# Patient Record
Sex: MALE | Race: WHITE | HISPANIC OR LATINO | Employment: UNEMPLOYED | URBAN - METROPOLITAN AREA
[De-identification: names, ages, dates, MRNs, and addresses within clinical notes are randomized per-mention and may not be internally consistent; named-entity substitution may affect disease eponyms.]

---
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
## 2024-06-12 ENCOUNTER — HOSPITAL ENCOUNTER (OUTPATIENT)
Age: 19
Discharge: HOME | End: 2024-06-12
Payer: COMMERCIAL

## 2024-06-12 VITALS
TEMPERATURE: 99 F | SYSTOLIC BLOOD PRESSURE: 128 MMHG | HEIGHT: 67 IN | RESPIRATION RATE: 19 BRPM | BODY MASS INDEX: 23.54 KG/M2 | HEART RATE: 79 BPM | WEIGHT: 150 LBS | DIASTOLIC BLOOD PRESSURE: 59 MMHG | OXYGEN SATURATION: 99 %

## 2024-06-12 DIAGNOSIS — L29.0 PRURITUS ANI: Primary | ICD-10-CM

## 2024-06-12 DIAGNOSIS — L23.9 ALLERGIC DERMATITIS: ICD-10-CM

## 2024-06-12 LAB — T PALLIDUM AB SER QL IA: NONREACTIVE

## 2024-06-12 PROCEDURE — 99203 OFFICE O/P NEW LOW 30 MIN: CPT

## 2024-06-12 PROCEDURE — 36415 COLL VENOUS BLD VENIPUNCTURE: CPT

## 2024-06-12 PROCEDURE — 86780 TREPONEMA PALLIDUM: CPT | Performed by: NURSE PRACTITIONER

## 2024-06-12 RX ORDER — PREDNISONE 20 MG/1
TABLET ORAL
Qty: 21 TABLET | Refills: 0 | Status: SHIPPED | OUTPATIENT
Start: 2024-06-12 | End: 2024-06-23

## 2024-06-12 RX ORDER — HYDROCORTISONE ACETATE PRAMOXINE HCL 1; 1 G/100G; G/100G
1 CREAM TOPICAL 2 TIMES DAILY
Qty: 30 G | Refills: 0 | Status: SHIPPED | OUTPATIENT
Start: 2024-06-12

## 2024-06-12 NOTE — DISCHARGE INSTRUCTIONS
Keep area clean and dry.  We will notify you of test results next 24 to 48 hours.  Follow-up with your primary care

## 2024-06-12 NOTE — ED INITIAL ASSESSMENT (HPI)
States he has a rash to arms, legs back and chest, not itchy and present for about 1 week. Also states his anus is itchy and the skin on his penis os flaking

## 2024-06-13 NOTE — ED PROVIDER NOTES
Patient Seen in: [facility]      History     Chief Complaint   Patient presents with    Rash     Stated Complaint: body rash, eval g    Subjective:   19-year-old male presents to immediate care for rash.  Patient states that he had a similar rash in February he was tested for STD and syphilis at that time which revealed negative results.  He states the rash has been coming and going does use some hydrocortisone cream over-the-counter however he states the rash returned shortly after stopping using it.  He denies any itching denies any known contacts.  He also reports itching and discomfort to his anus            Objective:   History reviewed. No pertinent past medical history.           History reviewed. No pertinent surgical history.             Social History     Socioeconomic History    Marital status: Single   Tobacco Use    Smoking status: Never    Smokeless tobacco: Never   Vaping Use    Vaping status: Never Used   Substance and Sexual Activity    Alcohol use: Not Currently    Drug use: Never              Review of Systems   Constitutional: Negative.    Respiratory: Negative.     Cardiovascular: Negative.    Gastrointestinal: Negative.    Skin: Negative.    Neurological: Negative.        Positive for stated complaint: body rash, eval g  Other systems are as noted in HPI.  Constitutional and vital signs reviewed.      All other systems reviewed and negative except as noted above.    Physical Exam     ED Triage Vitals [06/12/24 1826]   /59   Pulse 79   Resp 19   Temp 98.9 °F (37.2 °C)   Temp src Temporal   SpO2 99 %   O2 Device None (Room air)       Current Vitals:   Vital Signs  BP: 128/59  Pulse: 79  Resp: 19  Temp: 98.9 °F (37.2 °C)  Temp src: Temporal    Oxygen Therapy  SpO2: 99 %  O2 Device: None (Room air)            Physical Exam  Vitals and nursing note reviewed.   Constitutional:       General: He is not in acute distress.  HENT:      Head: Normocephalic.   Cardiovascular:       Rate and Rhythm: Normal rate.   Pulmonary:      Effort: Pulmonary effort is normal.   Musculoskeletal:         General: Normal range of motion.   Skin:     General: Skin is warm and dry.   Neurological:      General: No focal deficit present.      Mental Status: He is alert and oriented to person, place, and time.               ED Course     Labs Reviewed   T PALLIDUM SCREENING CASCADE                      MDM      Medical Decision Making  Pertinent Labs & Imaging studies reviewed. (See chart for details).  Patient coming in with rash, anal itching.   Differential diagnosis includes allergic urticaria, allergic dermatitis, pruritus ani, syphilis  Labs reviewed syphilis test pending.  Will treat for allergic dermatitis, pruritus ani.  Will discharge on prednisone, Proctofoam. Patient is comfortable with this plan.     Overall Pt looks good. Non-toxic, well-hydrated and in no respiratory distress. Vital signs are reassuring. Exam is reassuring. I do not believe pt requires and additional diagnostic studies or intervention. I believe pt can be discharged home to continue evaluation as an outpatient. Follow-up provider given. Discharge instructions given and reviewed. Return for any problems. All understand and agreewith the plan.     Please note that this report has been produced using speech recognition software and may contain errors related to that system including, but not limited to, errors in grammar, punctuation, and spelling, as well as words and phrases that possibly may have been recognized inappropriately. If there are any questions or concerns, contact the dictating provider for clarification.       The 21st Century Cures Act makes Medical Notes like these available to patients in the interest of transparency.  However, be advised this is a medical document.  It is intended as peer to peer communication.  It is written in medical language and may contain abbreviations or verbiage that are unfamiliar.  It  may appear blunt or direct.  Medical documents are intended to carry relevant information, facts as evident, and the clinical opinion of the practitioner      Problems Addressed:  Allergic dermatitis: acute illness or injury  Pruritus ani: acute illness or injury        Disposition and Plan     Clinical Impression:  1. Pruritus ani    2. Allergic dermatitis         Disposition:  Discharge  6/12/2024  7:02 pm    Follow-up:  Patrice Jarrett MD  [address] [facility] [address]  [facility] [address]  [phone]                Medications Prescribed:  Discharge Medication List as of 6/12/2024  7:07 PM        START taking these medications    Details   predniSONE 20 MG Oral Tab Take 3 tablets (60 mg total) by mouth daily for 2 days, THEN 2.5 tablets (50 mg total) daily for 2 days, THEN 2 tablets (40 mg total) daily for 2 days, THEN 1.5 tablets (30 mg total) daily for 2 days, THEN 1 tablet (20 mg total) daily for 2 days, THEN 0. 5 tablets (10 mg total) daily for 2 days., Normal, Disp-21 tablet, R-0      Hydrocortisone Ace-Pramoxine 1-1 % External Cream Apply 1 Application topically in the morning and 1 Application before bedtime., Normal, Disp-30 g, R-0

## 2024-06-14 ENCOUNTER — OFFICE VISIT (OUTPATIENT)
Dept: INTERNAL MEDICINE CLINIC | Facility: CLINIC | Age: 19
End: 2024-06-14
Payer: COMMERCIAL

## 2024-06-14 ENCOUNTER — LAB ENCOUNTER (OUTPATIENT)
Dept: LAB | Age: 19
End: 2024-06-14
Attending: INTERNAL MEDICINE
Payer: COMMERCIAL

## 2024-06-14 VITALS
RESPIRATION RATE: 16 BRPM | WEIGHT: 159.63 LBS | SYSTOLIC BLOOD PRESSURE: 120 MMHG | DIASTOLIC BLOOD PRESSURE: 56 MMHG | BODY MASS INDEX: 25.35 KG/M2 | OXYGEN SATURATION: 99 % | TEMPERATURE: 99 F | HEIGHT: 66.5 IN | HEART RATE: 80 BPM

## 2024-06-14 DIAGNOSIS — Z00.00 PREVENTATIVE HEALTH CARE: ICD-10-CM

## 2024-06-14 DIAGNOSIS — N48.89 PENILE IRRITATION: ICD-10-CM

## 2024-06-14 DIAGNOSIS — R21 RASH AND NONSPECIFIC SKIN ERUPTION: ICD-10-CM

## 2024-06-14 DIAGNOSIS — R21 RASH AND NONSPECIFIC SKIN ERUPTION: Primary | ICD-10-CM

## 2024-06-14 LAB
ALBUMIN SERPL-MCNC: 5.1 G/DL (ref 3.2–4.8)
ALBUMIN/GLOB SERPL: 1.5 {RATIO} (ref 1–2)
ALP LIVER SERPL-CCNC: 78 U/L
ALT SERPL-CCNC: 25 U/L
ANION GAP SERPL CALC-SCNC: 7 MMOL/L (ref 0–18)
AST SERPL-CCNC: 25 U/L (ref ?–34)
BASOPHILS # BLD AUTO: 0.02 X10(3) UL (ref 0–0.2)
BASOPHILS NFR BLD AUTO: 0.2 %
BILIRUB SERPL-MCNC: 1 MG/DL (ref 0.3–1.2)
BUN BLD-MCNC: 16 MG/DL (ref 9–23)
BUN/CREAT SERPL: 15.4 (ref 10–20)
CALCIUM BLD-MCNC: 10.3 MG/DL (ref 8.7–10.4)
CHLORIDE SERPL-SCNC: 104 MMOL/L (ref 98–112)
CO2 SERPL-SCNC: 28 MMOL/L (ref 21–32)
CREAT BLD-MCNC: 1.04 MG/DL
DEPRECATED RDW RBC AUTO: 38.1 FL (ref 35.1–46.3)
EGFRCR SERPLBLD CKD-EPI 2021: 105 ML/MIN/1.73M2 (ref 60–?)
EOSINOPHIL # BLD AUTO: 0 X10(3) UL (ref 0–0.7)
EOSINOPHIL NFR BLD AUTO: 0 %
ERYTHROCYTE [DISTWIDTH] IN BLOOD BY AUTOMATED COUNT: 12 % (ref 11–15)
FASTING STATUS PATIENT QL REPORTED: NO
GLOBULIN PLAS-MCNC: 3.3 G/DL (ref 2–3.5)
GLUCOSE BLD-MCNC: 122 MG/DL (ref 70–99)
HCT VFR BLD AUTO: 45.4 %
HGB BLD-MCNC: 15.6 G/DL
IMM GRANULOCYTES # BLD AUTO: 0.05 X10(3) UL (ref 0–1)
IMM GRANULOCYTES NFR BLD: 0.4 %
LYMPHOCYTES # BLD AUTO: 0.92 X10(3) UL (ref 1–4)
LYMPHOCYTES NFR BLD AUTO: 7.5 %
MCH RBC QN AUTO: 29.8 PG (ref 26–34)
MCHC RBC AUTO-ENTMCNC: 34.4 G/DL (ref 31–37)
MCV RBC AUTO: 86.8 FL
MONOCYTES # BLD AUTO: 0.15 X10(3) UL (ref 0.1–1)
MONOCYTES NFR BLD AUTO: 1.2 %
NEUTROPHILS # BLD AUTO: 11.2 X10 (3) UL (ref 1.5–7.7)
NEUTROPHILS # BLD AUTO: 11.2 X10(3) UL (ref 1.5–7.7)
NEUTROPHILS NFR BLD AUTO: 90.7 %
OSMOLALITY SERPL CALC.SUM OF ELEC: 290 MOSM/KG (ref 275–295)
PLATELET # BLD AUTO: 249 10(3)UL (ref 150–450)
POTASSIUM SERPL-SCNC: 4.4 MMOL/L (ref 3.5–5.1)
PROT SERPL-MCNC: 8.4 G/DL (ref 5.7–8.2)
RBC # BLD AUTO: 5.23 X10(6)UL
SODIUM SERPL-SCNC: 139 MMOL/L (ref 136–145)
WBC # BLD AUTO: 12.3 X10(3) UL (ref 4–11)

## 2024-06-14 PROCEDURE — 86225 DNA ANTIBODY NATIVE: CPT | Performed by: INTERNAL MEDICINE

## 2024-06-14 PROCEDURE — 82785 ASSAY OF IGE: CPT | Performed by: INTERNAL MEDICINE

## 2024-06-14 PROCEDURE — 3008F BODY MASS INDEX DOCD: CPT | Performed by: INTERNAL MEDICINE

## 2024-06-14 PROCEDURE — 80053 COMPREHEN METABOLIC PANEL: CPT | Performed by: INTERNAL MEDICINE

## 2024-06-14 PROCEDURE — 99203 OFFICE O/P NEW LOW 30 MIN: CPT | Performed by: INTERNAL MEDICINE

## 2024-06-14 PROCEDURE — 3078F DIAST BP <80 MM HG: CPT | Performed by: INTERNAL MEDICINE

## 2024-06-14 PROCEDURE — 3074F SYST BP LT 130 MM HG: CPT | Performed by: INTERNAL MEDICINE

## 2024-06-14 PROCEDURE — 86038 ANTINUCLEAR ANTIBODIES: CPT | Performed by: INTERNAL MEDICINE

## 2024-06-14 PROCEDURE — 85025 COMPLETE CBC W/AUTO DIFF WBC: CPT | Performed by: INTERNAL MEDICINE

## 2024-06-14 PROCEDURE — 86003 ALLG SPEC IGE CRUDE XTRC EA: CPT | Performed by: INTERNAL MEDICINE

## 2024-06-14 RX ORDER — CLOTRIMAZOLE AND BETAMETHASONE DIPROPIONATE 10; .64 MG/G; MG/G
1 CREAM TOPICAL 2 TIMES DAILY PRN
Qty: 45 G | Refills: 0 | Status: SHIPPED | OUTPATIENT
Start: 2024-06-14

## 2024-06-14 NOTE — PATIENT INSTRUCTIONS
- Continue prednisone tablets until prescription is finished  - Get further blood tests done today  - If your arm/leg rash does not improve or comes back again, follow up with Dermatology specialists:  Dr. Royal  [address]  [address]  [place], [place] [address]  [phone]    Dr. Camarena  [address] [facility]  [address]  [place], [place] [id number] [phone]    - For penile irritation/bumps, start prescription betamethasone-clotrimazole cream.  Apply twice daily for 2-3 weeks.    It was a pleasure seeing you in the clinic today.  Thank you for choosing the [facility] [place] office for your healthcare needs. Please call at [phone] with any questions or concerns.    Bessie Ackerman MD

## 2024-06-14 NOTE — ED NOTES
Attempted to contact the patient but was unsuccessful. Left a message for him to call back RE: Negative lab test.

## 2024-06-14 NOTE — PROGRESS NOTES
Aníbal Zamora is a 19 year old male.   HPI:   Patient presents to discuss several issues.  Has been dealing with a body wide rash for past few weeks - initially had one or two spots on legs about two weeks ago - then spread to rest of the body.   Seen at immediate care two days ago - started on prednisone taper.   No fevers/chills/night sweats.      Separately has been dealing with irritation of glans/penis area for several months.  Had STD testing in February which was negative.  Had repeat syphilis testing this week at immediate care which was also negative.    Pruritus ani - prescribed cream by immediate care but has not picked it up from pharmacy yet.    Past medical, family, surgical and social history were reviewed as listed in the chart, and are unchanged from previous visit on Visit date not found  REVIEW OF SYSTEMS:   GENERAL/ const: no fevers/chills, no unintentional weight loss  SKIN: generalized rash, pruritus ani  EYES:no vision problems  HEENT: denies sinus pain or sinus tenderness  LUNGS: denies shortness of breath   CARDIOVASCULAR: denies chest pain  GI: denies nausea/emesis/ abdominal pain diarrhea constipation  : penile irritation; denies dysuria   MUSCULOSKELETAL: no acute arthralgias  NEURO: denies headaches  PSYCHIATRIC: denies issues  ENDOCRINE: no hot/cold intolerance  ALLERGY: No Known Allergies  PAST HISTORY:     Current Outpatient Medications:     predniSONE 20 MG Oral Tab, Take 3 tablets (60 mg total) by mouth daily for 2 days, THEN 2.5 tablets (50 mg total) daily for 2 days, THEN 2 tablets (40 mg total) daily for 2 days, THEN 1.5 tablets (30 mg total) daily for 2 days, THEN 1 tablet (20 mg total) daily for 2 days, THEN 0.5 tablets (10 mg total) daily for 2 days., Disp: 21 tablet, Rfl: 0    Hydrocortisone Ace-Pramoxine 1-1 % External Cream, Apply 1 Application topically in the morning and 1 Application before bedtime. (Patient not taking: Reported on 6/14/2024), Disp: 30 g, Rfl:  0  Medical:  has no past medical history on file.  Surgical:  has no past surgical history on file.  Family: family history is not on file.  Social:  reports that he has never smoked. He has never used smokeless tobacco. He reports current alcohol use. He reports that he does not use drugs.  Wt Readings from Last 6 Encounters:   06/14/24 159 lb 9.6 oz (72.4 kg)   06/12/24 150 lb (68 kg)     EXAM:   /56 (BP Location: Right arm, Patient Position: Sitting, Cuff Size: adult)   Pulse 80   Temp 98.9 °F (37.2 °C) (Temporal)   Resp 16   Ht 5' 6.5\" (1.689 m)   Wt 159 lb 9.6 oz (72.4 kg)   SpO2 99%   BMI 25.37 kg/m²   GENERAL: Alert and oriented, well developed, well nourished,in no apparent distress  SKIN: scattered maculopapular lesions on arms, chest/back, legs  HEENT: atraumatic, PERRLA, EOMI, normal lid and conjunctiva  NECK: supple, no jvd, no thyromegaly  LUNGS: clear to auscultation bilaterally, no wheezing/rubs  CARDIO: RRR without murmurs.  No clubbing, cyanosis or edema.  GI: soft non tender nondistended no hepatosplenomegaly, bowel sounds throughout  : dry skin/irritation glans of penis  NEURO: CN II-XII intact, 5/5 strength all extremities  MS: Full ROM, no joint pain  PSYCH: pleasant, appropriate mood and affect  ASSESSMENT AND PLAN:   1. Rash and nonspecific skin eruption  Patient with generalized rash, started two weeks ago with small spots on legs, has spread.  Seen at immediate care two days ago - started on prednisone taper.  No pruritus.  No constitutional symptoms.  Will check labs as below.  Recommend follow up with Dermatology if rash persists or recurs - contact information provided.  - Comp Metabolic Panel (14); Future  - CBC With Differential With Platelet; Future  - Adult Food Allergy Prof; Future  - Allergy Region 8; Future  - Connective Tissue Disease (VALERIA) Screen; Future    2. Penile irritation  Separately has had issues with penile irritation on glans of penis.  Especially after  intercourse.  No penile discharge, no dysuria.  Normal STD testing in February.  Repeat syphilis testing normal this week.  Possible balanitis based on examination.  Will start on betamethasone-clotrimazole cream.  May have patient follow up with Urology if lesions persist.  - clotrimazole-betamethasone 1-0.05 % External Cream; Apply 1 Application topically 2 (two) times daily as needed.  Dispense: 45 g; Refill: 0    3. Preventative health care  Labs ordered.    - Comp Metabolic Panel (14); Future  - CBC With Differential With Platelet; Future    Patient Care Team:  Patrice Jarrett MD as PCP - General (PEDIATRICS)  The patient indicates understanding of these issues and agrees to the plan.  The patient is asked to return to clinic as needed with myself.    Bessie Ackerman MD

## 2024-06-17 LAB
A ALTERNATA IGE QN: <0.1 KUA/L (ref ?–0.1)
A FUMIGATUS IGE QN: <0.1 KUA/L (ref ?–0.1)
AMER SYCAMORE IGE QN: 0.1 KUA/L (ref ?–0.1)
BERMUDA GRASS IGE QN: 35.7 KUA/L (ref ?–0.1)
BOXELDER IGE QN: 1.22 KUA/L (ref ?–0.1)
C HERBARUM IGE QN: <0.1 KUA/L (ref ?–0.1)
CALIF WALNUT IGE QN: 0.14 KUA/L (ref ?–0.1)
CAT DANDER IGE QN: <0.1 KUA/L (ref ?–0.1)
CMN PIGWEED IGE QN: <0.1 KUA/L (ref ?–0.1)
COMMON RAGWEED IGE QN: 0.19 KUA/L (ref ?–0.1)
COTTONWOOD IGE QN: <0.1 KUA/L (ref ?–0.1)
D FARINAE IGE QN: <0.1 KUA/L (ref ?–0.1)
D PTERONYSS IGE QN: 0.1 KUA/L (ref ?–0.1)
DOG DANDER IGE QN: <0.1 KUA/L (ref ?–0.1)
DSDNA IGG SERPL IA-ACNC: 4.2 IU/ML
ENA AB SER QL IA: 0.3 UG/L
ENA AB SER QL IA: NEGATIVE
IGE SERPL-ACNC: 320 KU/L (ref 2–214)
M RACEMOSUS IGE QN: <0.1 KUA/L (ref ?–0.1)
MARSH ELDER IGE QN: 0.11 KUA/L (ref ?–0.1)
MOUSE EPITH IGE QN: <0.1 KUA/L (ref ?–0.1)
MT JUNIPER IGE QN: 0.13 KUA/L (ref ?–0.1)
P NOTATUM IGE QN: <0.1 KUA/L (ref ?–0.1)
PECAN/HICK TREE IGE QN: <0.1 KUA/L (ref ?–0.1)
ROACH IGE QN: 0.12 KUA/L (ref ?–0.1)
SALTWORT IGE QN: 0.11 KUA/L (ref ?–0.1)
SILVER BIRCH IGE QN: <0.1 KUA/L (ref ?–0.1)
TIMOTHY IGE QN: >100 KUA/L (ref ?–0.1)
WHITE ASH IGE QN: 2.09 KUA/L (ref ?–0.1)
WHITE ELM IGE QN: 0.19 KUA/L (ref ?–0.1)
WHITE MULBERRY IGE QN: <0.1 KUA/L (ref ?–0.1)
WHITE OAK IGE QN: <0.1 KUA/L (ref ?–0.1)

## 2024-06-18 LAB
ALLERGEN BRAZIL NUT: <0.1 KUA/L (ref ?–0.1)
ALMOND IGE QN: 0.11 KUA/L (ref ?–0.1)
CASHEW NUT IGE QN: <0.1 KUA/L (ref ?–0.1)
CLAM IGE QN: <0.1 KUA/L (ref ?–0.1)
CODFISH IGE QN: <0.1 KUA/L (ref ?–0.1)
CORN IGE QN: 0.11 KUA/L (ref ?–0.1)
COW MILK IGE QN: 0.16 KUA/L (ref ?–0.1)
EGG WHITE IGE QN: <0.1 KUA/L (ref ?–0.1)
HAZELNUT IGE QN: <0.1 KUA/L (ref ?–0.1)
IGE SERPL-ACNC: 299 KU/L (ref 2–214)
PEANUT IGE QN: 0.1 KUA/L (ref ?–0.1)
SALMON IGE QN: <0.1 KUA/L (ref ?–0.1)
SCALLOP IGE QN: <0.1 KUA/L (ref ?–0.1)
SESAME SEED IGE QN: 0.11 KUA/L (ref ?–0.1)
SHRIMP IGE QN: 0.14 KUA/L (ref ?–0.1)
SOYBEAN IGE QN: <0.1 KUA/L (ref ?–0.1)
WALNUT IGE QN: <0.1 KUA/L (ref ?–0.1)
WHEAT IGE QN: 0.18 KUA/L (ref ?–0.1)

## 2025-02-25 ENCOUNTER — HOSPITAL ENCOUNTER (OUTPATIENT)
Age: 20
Discharge: HOME | End: 2025-02-25
Payer: COMMERCIAL

## 2025-02-25 ENCOUNTER — TELEPHONE (OUTPATIENT)
Dept: INTERNAL MEDICINE CLINIC | Facility: CLINIC | Age: 20
End: 2025-02-25

## 2025-02-25 VITALS
WEIGHT: 160 LBS | SYSTOLIC BLOOD PRESSURE: 130 MMHG | RESPIRATION RATE: 18 BRPM | DIASTOLIC BLOOD PRESSURE: 68 MMHG | OXYGEN SATURATION: 100 % | TEMPERATURE: 99 F | BODY MASS INDEX: 25 KG/M2 | HEART RATE: 91 BPM

## 2025-02-25 DIAGNOSIS — Z00.00 ANNUAL PHYSICAL EXAM: Primary | ICD-10-CM

## 2025-02-25 DIAGNOSIS — B34.9 VIRAL ILLNESS: Primary | ICD-10-CM

## 2025-02-25 LAB
POCT INFLUENZA A: NEGATIVE
POCT INFLUENZA B: NEGATIVE
SARS-COV-2 RNA RESP QL NAA+PROBE: NOT DETECTED

## 2025-02-25 PROCEDURE — 99213 OFFICE O/P EST LOW 20 MIN: CPT

## 2025-02-25 PROCEDURE — S0119 ONDANSETRON 4 MG: HCPCS

## 2025-02-25 PROCEDURE — 87502 INFLUENZA DNA AMP PROBE: CPT | Performed by: NURSE PRACTITIONER

## 2025-02-25 PROCEDURE — 99214 OFFICE O/P EST MOD 30 MIN: CPT

## 2025-02-25 RX ORDER — ACETAMINOPHEN 500 MG
1000 TABLET ORAL ONCE
Status: COMPLETED | OUTPATIENT
Start: 2025-02-25 | End: 2025-02-25

## 2025-02-25 RX ORDER — IBUPROFEN 600 MG/1
600 TABLET, FILM COATED ORAL ONCE
Status: COMPLETED | OUTPATIENT
Start: 2025-02-25 | End: 2025-02-25

## 2025-02-25 RX ORDER — ONDANSETRON 4 MG/1
4 TABLET, ORALLY DISINTEGRATING ORAL EVERY 4 HOURS PRN
Qty: 10 TABLET | Refills: 0 | Status: SHIPPED | OUTPATIENT
Start: 2025-02-25 | End: 2025-03-04

## 2025-02-25 RX ORDER — ONDANSETRON 4 MG/1
4 TABLET, ORALLY DISINTEGRATING ORAL ONCE
Status: COMPLETED | OUTPATIENT
Start: 2025-02-25 | End: 2025-02-25

## 2025-02-25 NOTE — TELEPHONE ENCOUNTER
Pt is wanting his annual labs to be placed in. Can this be done?    Future Appointments   Date Time Provider Department Center   3/10/2025  3:40 PM Juan Manuel Smith MD [facility] [place]

## 2025-02-26 NOTE — ED PROVIDER NOTES
Patient Seen in: [facility]      History     Chief Complaint   Patient presents with    Fever    Headache    Nausea     Stated Complaint: Headache; Nausea    Subjective:   HPI      Patient is here today with complaints of headaches, nausea, fever, chills, not feeling well since Sunday.  Reports that his brother is ill at home with the same symptoms.  He reports that he has been eating and drinking but less than normal.  He has vomited 2-3 times today for the past couple of days.  He denies any shortness of breath or chest pain.    Objective:     History reviewed. No pertinent past medical history.           History reviewed. No pertinent surgical history.             Social History     Socioeconomic History    Marital status: Single   Tobacco Use    Smoking status: Never    Smokeless tobacco: Never   Vaping Use    Vaping status: Never Used   Substance and Sexual Activity    Alcohol use: Yes     Comment: Moderate    Drug use: Never   Other Topics Concern    Caffeine Concern No    Exercise Yes     Comment: 3-4x/week              Review of Systems    Positive for stated complaint: Headache; Nausea  Other systems are as noted in HPI.  Constitutional and vital signs reviewed.      All other systems reviewed and negative except as noted above.    Physical Exam     ED Triage Vitals [02/25/25 1650]   /79   Pulse 101   Resp 18   Temp 100.1 °F (37.8 °C)   Temp src Oral   SpO2 98 %   O2 Device None (Room air)       Current Vitals:   Vital Signs  BP: 130/68  Pulse: 91  Resp: 18  Temp: 99.4 °F (37.4 °C)  Temp src: Oral    Oxygen Therapy  SpO2: 100 %  O2 Device: None (Room air)        Physical Exam  VS: Vital signs reviewed. O2 saturation within normal limits for this patient     General: Patient is awake and alert, oriented to person, place and time. Not in acute distress.      HEENT: Head is normocephalic atraumatic. Pupils reactive bilaterally.  EOMs intact.    Lungs: good inspiratory effort. +air entry  bilaterally without wheezes, rhonchi, crackles.  No accessory muscle use or tachypnea.       Abdomen: Soft, nontender, nondistended.  Active bowel sounds present.       Extremities: No edema.  Pulses 2+ extremities.   Brisk capillary refill noted.      Skin: Normal skin turgor     CNS: Moves all 4 extremities.  Interacts appropriately.  No tremor.  No gait abnormality    Differential diagnosis: Viral gastroenteritis, foodborne illness, influenza, COVID      ED Course     Labs Reviewed   POCT FLU TEST - Normal    Narrative:     This assay is a rapid molecular in vitro test utilizing nucleic acid amplification of influenza A and B viral RNA.   RAPID SARS-COV-2 BY PCR - Normal            I have personally  reviewed available prior medical records for any recent pertinent discharge summaries/testing. Patient/family updated on results and plan, a verbalized understanding and agreement with the plan.  I explained to the patient that emergent conditions may arise and to go to the ER for new, worsening or any persistent conditions. I've explained the importance of taking all medicatons as prescribed, follow up, and return precuations,  All questions answered.    Please note that this report has been produced using speech recognition software and may contain errors related to that system including, but not limited to, errors in grammar, punctuation, and spelling, as well as words and phrases that possibly may have been recognized inappropriately.  If there are any questions or concerns, contact the dictating provider for clarification.       MDM      Patient presents with cough, congestion, nausea, vomiting since Sunday nontoxic, does not meet SIRS criteria.   Patient does not have uvula deviation or unilateral tonsillar swelling to indicate tonsillar abscess. No meningsmus or trismus. No dysphagia or difficulty handing secretions. No evidence for otitis media. Patient does not have any respiratory distress.  O2 saturation  within normal limits for this patient. Does not appear clinically dehydrated and is tolerating oral intake. Presentation consistent with a viral process patient was given Zofran, ibuprofen with relief of his symptoms.  Patient is tolerating p.o. fluids without nausea, vomiting.. Encouraged patient on oral hydration and supportive care. Recommend follow up with PCP.  Return to ED precautions discussed with the patient and family.        Medical Decision Making      Disposition and Plan     Clinical Impression:  1. Viral illness         Disposition:  Discharge  2/25/2025  5:54 pm    Follow-up:  Patrice Jarrett MD  [address] [address]  [phone]                Medications Prescribed:  Discharge Medication List as of 2/25/2025  5:55 PM        START taking these medications    Details   ondansetron 4 MG Oral Tablet Dispersible Take 1 tablet (4 mg total) by mouth every 4 (four) hours as needed for Nausea., Normal, Disp-10 tablet, R-0                 Supplementary Documentation:

## 2025-03-05 ENCOUNTER — HOSPITAL ENCOUNTER (OUTPATIENT)
Age: 20
Discharge: HOME | End: 2025-03-05
Attending: EMERGENCY MEDICINE
Payer: COMMERCIAL

## 2025-03-05 ENCOUNTER — APPOINTMENT (OUTPATIENT)
Dept: CT IMAGING | Age: 20
End: 2025-03-05
Attending: EMERGENCY MEDICINE
Payer: COMMERCIAL

## 2025-03-05 VITALS
SYSTOLIC BLOOD PRESSURE: 128 MMHG | HEART RATE: 88 BPM | OXYGEN SATURATION: 98 % | TEMPERATURE: 99 F | RESPIRATION RATE: 18 BRPM | DIASTOLIC BLOOD PRESSURE: 72 MMHG

## 2025-03-05 DIAGNOSIS — R11.0 NAUSEA: Primary | ICD-10-CM

## 2025-03-05 DIAGNOSIS — R59.0 CERVICAL LYMPHADENOPATHY: ICD-10-CM

## 2025-03-05 LAB
BASOPHILS # BLD AUTO: 0.04 X10(3) UL (ref 0–0.2)
BASOPHILS NFR BLD AUTO: 0.3 %
BUN BLD-MCNC: <5 MG/DL (ref 7–18)
CHLORIDE BLD-SCNC: 99 MMOL/L (ref 98–112)
CO2 BLD-SCNC: 27 MMOL/L (ref 21–32)
CREAT BLD-MCNC: 1.1 MG/DL
EGFRCR SERPLBLD CKD-EPI 2021: 99 ML/MIN/1.73M2 (ref 60–?)
EOSINOPHIL # BLD AUTO: 0.01 X10(3) UL (ref 0–0.7)
EOSINOPHIL NFR BLD AUTO: 0.1 %
ERYTHROCYTE [DISTWIDTH] IN BLOOD BY AUTOMATED COUNT: 12.8 %
GLUCOSE BLD-MCNC: 100 MG/DL (ref 70–99)
HCT VFR BLD AUTO: 44.2 %
HCT VFR BLD AUTO: 44.8 %
HCT VFR BLD CALC: 48 %
HGB BLD-MCNC: 14.7 G/DL
HGB BLD-MCNC: 15.1 G/DL
IMM GRANULOCYTES # BLD AUTO: 0.02 X10(3) UL (ref 0–1)
IMM GRANULOCYTES NFR BLD: 0.2 %
ISTAT IONIZED CALCIUM FOR CHEM 8: 1.16 MMOL/L (ref 1.12–1.32)
LYMPHOCYTES # BLD AUTO: 9.91 X10(3) UL (ref 1–4)
LYMPHOCYTES NFR BLD AUTO: 81.4 %
MCH RBC QN AUTO: 28.5 PG (ref 26–34)
MCH RBC QN AUTO: 28.9 PG (ref 26–34)
MCHC RBC AUTO-ENTMCNC: 32.8 G/DL (ref 31–37)
MCHC RBC AUTO-ENTMCNC: 34.2 G/DL (ref 31–37)
MCV RBC AUTO: 84.5 FL
MCV RBC AUTO: 87 FL (ref 80–100)
MONOCYTES # BLD AUTO: 0.55 X10(3) UL (ref 0.1–1)
MONOCYTES NFR BLD AUTO: 4.5 %
MORPHOLOGY: NORMAL
NEUTROPHILS # BLD AUTO: 1.64 X10 (3) UL (ref 1.5–7.7)
NEUTROPHILS # BLD AUTO: 1.64 X10(3) UL (ref 1.5–7.7)
NEUTROPHILS NFR BLD AUTO: 13.5 %
PLATELET # BLD AUTO: 177 10(3)UL (ref 150–450)
PLATELET # BLD AUTO: 179 X10ˆ3/UL (ref 150–450)
PLATELET MORPHOLOGY: NORMAL
POCT INFLUENZA A: NEGATIVE
POCT INFLUENZA B: NEGATIVE
POCT MONO: NEGATIVE
POTASSIUM BLD-SCNC: 3.5 MMOL/L (ref 3.6–5.1)
RBC # BLD AUTO: 5.15 X10ˆ6/UL
RBC # BLD AUTO: 5.23 X10(6)UL
SARS-COV-2 RNA RESP QL NAA+PROBE: NOT DETECTED
SODIUM BLD-SCNC: 139 MMOL/L (ref 136–145)
WBC # BLD AUTO: 11.7 X10ˆ3/UL (ref 4–11)
WBC # BLD AUTO: 12.2 X10(3) UL (ref 4–11)

## 2025-03-05 PROCEDURE — 96374 THER/PROPH/DIAG INJ IV PUSH: CPT

## 2025-03-05 PROCEDURE — 70491 CT SOFT TISSUE NECK W/DYE: CPT | Performed by: EMERGENCY MEDICINE

## 2025-03-05 PROCEDURE — 99215 OFFICE O/P EST HI 40 MIN: CPT

## 2025-03-05 PROCEDURE — 86308 HETEROPHILE ANTIBODY SCREEN: CPT | Performed by: EMERGENCY MEDICINE

## 2025-03-05 PROCEDURE — 87502 INFLUENZA DNA AMP PROBE: CPT | Performed by: EMERGENCY MEDICINE

## 2025-03-05 PROCEDURE — 99214 OFFICE O/P EST MOD 30 MIN: CPT

## 2025-03-05 PROCEDURE — 96361 HYDRATE IV INFUSION ADD-ON: CPT

## 2025-03-05 PROCEDURE — 85025 COMPLETE CBC W/AUTO DIFF WBC: CPT | Performed by: EMERGENCY MEDICINE

## 2025-03-05 PROCEDURE — 80047 BASIC METABLC PNL IONIZED CA: CPT

## 2025-03-05 RX ORDER — ONDANSETRON 2 MG/ML
4 INJECTION INTRAMUSCULAR; INTRAVENOUS ONCE
Status: COMPLETED | OUTPATIENT
Start: 2025-03-05 | End: 2025-03-05

## 2025-03-05 RX ORDER — SODIUM CHLORIDE 9 MG/ML
1000 INJECTION, SOLUTION INTRAVENOUS ONCE
Status: COMPLETED | OUTPATIENT
Start: 2025-03-05 | End: 2025-03-05

## 2025-03-05 RX ORDER — POTASSIUM CHLORIDE 1500 MG/1
20 TABLET, EXTENDED RELEASE ORAL ONCE
Status: COMPLETED | OUTPATIENT
Start: 2025-03-05 | End: 2025-03-05

## 2025-03-05 RX ORDER — IOHEXOL 350 MG/ML
55 INJECTION, SOLUTION INTRAVENOUS
Status: COMPLETED | OUTPATIENT
Start: 2025-03-05 | End: 2025-03-05

## 2025-03-05 RX ORDER — CEPHALEXIN 500 MG/1
500 CAPSULE ORAL 4 TIMES DAILY
Qty: 40 CAPSULE | Refills: 0 | Status: SHIPPED | OUTPATIENT
Start: 2025-03-05 | End: 2025-03-05

## 2025-03-05 RX ORDER — ONDANSETRON 8 MG/1
8 TABLET, ORALLY DISINTEGRATING ORAL EVERY 6 HOURS PRN
Qty: 10 TABLET | Refills: 0 | Status: SHIPPED | OUTPATIENT
Start: 2025-03-05

## 2025-03-05 NOTE — ED PROVIDER NOTES
Patient Seen in: [facility]      History     Chief Complaint   Patient presents with    Nausea     Stated Complaint: nausea, headache    Subjective:   HPI      Patient was to urgent care about 10 days ago with complaints of headache, nausea, fever, and chills ongoing for few days at that time.  His brother was ill with similar symptoms.  He had nausea and vomiting at that time.  Patient was diagnosed with a viral illness and discharged home with nausea medicine.  Flu test and COVID test were negative.  Patient reports symptoms of persistent nausea but no vomiting.  He did have diarrhea 2 days ago for 1 day.  None since.  No more fevers.  He has a mild headache that comes and goes.  No headache at this time.  Patient denies abdominal pain.  Girlfriend notes that she noticed a lump on patient's neck.  He points to an area just posterior to the angle of the jaw on the right side.  He notes it is swollen and sore.  No sore throat.  Patient denies marijuana use  No recent travel, unusual foods, or others ill with similar symptoms    Objective:     History reviewed. No pertinent past medical history.           History reviewed. No pertinent surgical history.             Social History     Socioeconomic History    Marital status: Single   Tobacco Use    Smoking status: Never    Smokeless tobacco: Never   Vaping Use    Vaping status: Never Used   Substance and Sexual Activity    Alcohol use: Yes     Comment: Moderate    Drug use: Never   Other Topics Concern    Caffeine Concern No    Exercise Yes     Comment: 3-4x/week              Review of Systems    Positive for stated complaint: nausea, headache  Other systems are as noted in HPI.  Constitutional and vital signs reviewed.      All other systems reviewed and negative except as noted above.    Physical Exam     ED Triage Vitals [03/05/25 1724]   /72   Pulse 88   Resp 18   Temp 99 °F (37.2 °C)   Temp src Oral   SpO2 98 %   O2 Device None (Room air)        Current Vitals:   Vital Signs  BP: 128/72  Pulse: 88  Resp: 18  Temp: 99 °F (37.2 °C)  Temp src: Oral    Oxygen Therapy  SpO2: 98 %  O2 Device: None (Room air)        Physical Exam  General: The patient is awake, alert, conversant.  He appears in no distress and is breathing comfortably and speaking full clear voice  Eyes: sclera white, conjunctiva pink and moist.  Lids and lashes are normal.  Throat: Posterior pharynx is normal.   Neck:  Just posterior to the angle of the mandible on the right side there is an area of soft tissue swelling suggesting lymphadenopathy.  Abdomen: Soft, nondistended.  Mildly tender in the epigastrium.  Right left upper quadrant is benign.  No splenomegaly.  Lower abdomen completely benign even with deep and repetitive palpation.  Skin: Not particularly pale  Neurologic:  Mental status as above.  Patient moves all extremities with good strength and coordination.        ED Course     Labs Reviewed   POCT CBC - Abnormal; Notable for the following components:       Result Value    WBC IC 11.7 (*)     All other components within normal limits   POCT ISTAT CHEM8 CARTRIDGE - Abnormal; Notable for the following components:    ISTAT BUN <5 (*)     ISTAT Potassium 3.5 (*)     ISTAT Glucose 100 (*)     All other components within normal limits   POCT MONO TEST - Normal   RAPID SARS-COV-2 BY PCR - Normal   POCT FLU TEST - Normal    Narrative:     This assay is a rapid molecular in vitro test utilizing nucleic acid amplification of influenza A and B viral RNA.   CBC W AUTO DIFF                   MDM     Patient with persistent nausea.  He did have vomiting earlier and more recently diarrhea.  He also has some mild epigastric abdominal pain.  Infectious gastroenteritis include the differential.  Gastritis also consideration.  Certainly no evidence of appendicitis.  The swelling in the neck is posterior to the angle of the jaw and not typical of parotitis.  I suspect reactive  lymphadenopathy    Patient treated with IV fluid nausea medicine     Hemogram shows elevated white count 11.7.  Hemoglobin and platelets are adequate  I-STAT shows mild hypokalemia treated with oral potassium supplement.  Glucose and creatinine normal  Monospot negative  Flu swab negative  COVID test negative    CT neck  CONCLUSION:  Extensive cervical lymphadenopathy noted as detailed above.  There is also mild bilateral tonsillar hypertrophy.  Differential considerations include reactive inflammatory lymph nodes.  Infectious mononucleosis should be considered.  Other lymphoproliferative processes such as lymphoma not excluded.  Correlate clinically and follow-up recommended.     I reviewed the results of the workup with the patient including the radiologist differential diagnosis.  I strongly recommend that he keep his appointment later this week with the doctor as was scheduled.  He was also already prescribed blood work that can be performed as an outpatient.  I encouraged him to complete this.  I discussed with patient that if his laboratories are abnormal, he may require further intervention such as lymph node biopsies.  Patient and his girlfriend verbalized understanding my discussion      Medical Decision Making      Disposition and Plan     Clinical Impression:  1. Nausea    2. Cervical lymphadenopathy         Disposition:  Discharge  3/5/2025  7:34 pm    Follow-up:  Patrice Jarrett MD  [address] [address]  [phone]                Medications Prescribed:  Current Discharge Medication List        START taking these medications    Details   amoxicillin clavulanate 875-125 MG Oral Tab Take 1 tablet by mouth 2 (two) times daily for 10 days.  Qty: 20 tablet, Refills: 0                 Supplementary Documentation:

## 2025-03-05 NOTE — ED INITIAL ASSESSMENT (HPI)
Here last week for the same, flu/covid negative. Still nauseous and headache that has not gone away.

## 2025-03-06 NOTE — DISCHARGE INSTRUCTIONS
Avoid common stomach irritants like alcohol, cigarette smoke, acidic foods and beverages (especially tomato products, juices, and soda), caffeine, and NSAID medications like Motrin or Aleve  Over-the-counter Pepcid Complete twice daily  Zofran around-the-clock for the first day, then as needed

## 2025-03-10 ENCOUNTER — LAB ENCOUNTER (OUTPATIENT)
Dept: LAB | Age: 20
End: 2025-03-10
Attending: INTERNAL MEDICINE
Payer: COMMERCIAL

## 2025-03-10 ENCOUNTER — OFFICE VISIT (OUTPATIENT)
Dept: INTERNAL MEDICINE CLINIC | Facility: CLINIC | Age: 20
End: 2025-03-10
Payer: COMMERCIAL

## 2025-03-10 VITALS
BODY MASS INDEX: 25.9 KG/M2 | TEMPERATURE: 98 F | HEIGHT: 67 IN | HEART RATE: 90 BPM | DIASTOLIC BLOOD PRESSURE: 73 MMHG | SYSTOLIC BLOOD PRESSURE: 130 MMHG | OXYGEN SATURATION: 98 % | WEIGHT: 165 LBS

## 2025-03-10 DIAGNOSIS — Z00.00 ANNUAL PHYSICAL EXAM: ICD-10-CM

## 2025-03-10 DIAGNOSIS — J02.9 SORE THROAT: Primary | ICD-10-CM

## 2025-03-10 DIAGNOSIS — J02.9 SORE THROAT: ICD-10-CM

## 2025-03-10 LAB
ALBUMIN SERPL-MCNC: 4.4 G/DL (ref 3.2–4.8)
ALBUMIN/GLOB SERPL: 1.2 {RATIO} (ref 1–2)
ALP LIVER SERPL-CCNC: 584 U/L
ALT SERPL-CCNC: 624 U/L
ANION GAP SERPL CALC-SCNC: 8 MMOL/L (ref 0–18)
AST SERPL-CCNC: 308 U/L (ref ?–34)
BASOPHILS # BLD: 0 X10(3) UL (ref 0–0.2)
BASOPHILS NFR BLD: 0 %
BILIRUB SERPL-MCNC: 3.4 MG/DL (ref 0.3–1.2)
BUN BLD-MCNC: 7 MG/DL (ref 9–23)
CALCIUM BLD-MCNC: 9.5 MG/DL (ref 8.7–10.6)
CHLORIDE SERPL-SCNC: 98 MMOL/L (ref 98–112)
CHOLEST SERPL-MCNC: 208 MG/DL (ref ?–200)
CO2 SERPL-SCNC: 30 MMOL/L (ref 21–32)
CONTROL LINE PRESENT WITH A CLEAR BACKGROUND (YES/NO): YES YES/NO
CREAT BLD-MCNC: 1.14 MG/DL
EGFRCR SERPLBLD CKD-EPI 2021: 94 ML/MIN/1.73M2 (ref 60–?)
EOSINOPHIL # BLD: 0 X10(3) UL (ref 0–0.7)
EOSINOPHIL NFR BLD: 0 %
ERYTHROCYTE [DISTWIDTH] IN BLOOD BY AUTOMATED COUNT: 14.1 %
FASTING PATIENT LIPID ANSWER: YES
FASTING STATUS PATIENT QL REPORTED: YES
GLOBULIN PLAS-MCNC: 3.8 G/DL (ref 2–3.5)
GLUCOSE BLD-MCNC: 82 MG/DL (ref 70–99)
HCT VFR BLD AUTO: 43.8 %
HDLC SERPL-MCNC: 22 MG/DL (ref 40–59)
HETEROPH AB SER QL: NEGATIVE
HGB BLD-MCNC: 14.8 G/DL
KIT LOT #: NORMAL NUMERIC
LDLC SERPL CALC-MCNC: 166 MG/DL (ref ?–100)
LYMPHOCYTES NFR BLD: 68 %
LYMPHOCYTES NFR BLD: 9.11 X10(3) UL (ref 1–4)
MCH RBC QN AUTO: 28.6 PG (ref 26–34)
MCHC RBC AUTO-ENTMCNC: 33.8 G/DL (ref 31–37)
MCV RBC AUTO: 84.6 FL
MONOCYTES # BLD: 1.07 X10(3) UL (ref 0.1–1)
MONOCYTES NFR BLD: 8 %
NEUTROPHILS # BLD AUTO: 1.69 X10 (3) UL (ref 1.5–7.7)
NEUTROPHILS NFR BLD: 24 %
NEUTS HYPERSEG # BLD: 3.22 X10(3) UL (ref 1.5–7.7)
NONHDLC SERPL-MCNC: 186 MG/DL (ref ?–130)
OSMOLALITY SERPL CALC.SUM OF ELEC: 279 MOSM/KG (ref 275–295)
PLATELET # BLD AUTO: 220 10(3)UL (ref 150–450)
PLATELET MORPHOLOGY: NORMAL
POTASSIUM SERPL-SCNC: 4.6 MMOL/L (ref 3.5–5.1)
PROT SERPL-MCNC: 8.2 G/DL (ref 5.7–8.2)
RBC # BLD AUTO: 5.18 X10(6)UL
SODIUM SERPL-SCNC: 136 MMOL/L (ref 136–145)
STREP GRP A CUL-SCR: NEGATIVE
TOTAL CELLS COUNTED BLD: 100
TRIGL SERPL-MCNC: 109 MG/DL (ref 30–149)
TSI SER-ACNC: 2.11 UIU/ML (ref 0.55–4.78)
VLDLC SERPL CALC-MCNC: 21 MG/DL (ref 0–30)
WBC # BLD AUTO: 13.4 X10(3) UL (ref 4–11)

## 2025-03-10 PROCEDURE — 85027 COMPLETE CBC AUTOMATED: CPT

## 2025-03-10 PROCEDURE — 80061 LIPID PANEL: CPT

## 2025-03-10 PROCEDURE — 84443 ASSAY THYROID STIM HORMONE: CPT

## 2025-03-10 PROCEDURE — 80053 COMPREHEN METABOLIC PANEL: CPT

## 2025-03-10 PROCEDURE — 86403 PARTICLE AGGLUT ANTBDY SCRN: CPT

## 2025-03-10 PROCEDURE — 85007 BL SMEAR W/DIFF WBC COUNT: CPT

## 2025-03-10 PROCEDURE — 85025 COMPLETE CBC W/AUTO DIFF WBC: CPT

## 2025-03-10 PROCEDURE — 36415 COLL VENOUS BLD VENIPUNCTURE: CPT

## 2025-03-10 NOTE — PROGRESS NOTES
New Patient Office Visit    CC:    Nausea for 2 weeks  Sore throat for last 3 days    HPI:     The patient is a 19-year-old man had nausea and that made him go to the urgent care.  The patient had a CT scan of the neck and it revealed extensive cervical lymphadenopathy bilaterally.  Right-sided more than the left side.  COVID test and the flu test was negative.  The radiological findings were more consistent with infectious mononucleosis.    History reviewed. No pertinent surgical history.    Social History:  Social History     Socioeconomic History    Marital status: Single   Tobacco Use    Smoking status: Former     Types: Cigarettes    Smokeless tobacco: Former   Vaping Use    Vaping status: Never Used   Substance and Sexual Activity    Alcohol use: Yes     Comment: Moderate    Drug use: Not Currently   Other Topics Concern    Caffeine Concern No    Exercise Yes     Comment: 3-4x/week     Family History:  History reviewed. No pertinent family history.  Allergies:  Allergies[1]  Current Meds:  Medications Ordered Prior to Encounter[2]      REVIEW OF SYSTEMS   Constitutional: no fatigue, normal energy,  no weight changes   HENT: Sore throat and mild headache  Eyes: . normal vision no eye pain  Respiratory: normal respirations no cough   Cardiovascular: no chest pain  or palpitations   Gastrointestinal: normal bowels and no abd pains   Genitourinary:  normal urination no hematuria, no frequency   Musculoskeletal: . No joint pain, no muscular pain  Skin: no rashes or skin lesions that are new   Neurological:  no weakness, no numbness, normal gait  Hematological:  no bruises or bleeding   Psychiatric/Behavioral: normal mood no anxiety normal behavior     /73 (BP Location: Left arm, Patient Position: Sitting, Cuff Size: adult)   Pulse 90   Temp 98.2 °F (36.8 °C) (Temporal)   Ht 5' 7\" (1.702 m)   Wt 165 lb (74.8 kg)   SpO2 98%   BMI 25.84 kg/m²     No results found for: \"EAG\", \"A1C\"   Lab Results   Component  Value Date    WBC 12.2 (H) 03/05/2025    RBC 5.23 03/05/2025    HGB 15.1 03/05/2025    HCT 44.2 03/05/2025    MCV 87.0 03/05/2025    MCV 84.5 03/05/2025    MCH 28.9 03/05/2025    MCHC 32.8 03/05/2025    MCHC 34.2 03/05/2025    RDW 12.8 03/05/2025    .0 03/05/2025      No results found for this or any previous visit (from the past 4380 hours).   No results found for: \"CHOLEST\", \"TRIG\", \"HDL\", \"LDL\", \"VLDL\", \"TCHDLRATIO\", \"NONHDLC\", \"CHOLHDLRATIO\", \"CALCNONHDL\"     PHYSICAL EXAM:   Constitutional: Vital signs reviewed as noted, well developed  Pleasant patient and appears their stated age  HEENT: NCAT  Right and left jugulodigastric/submandibular lymphadenopathy.  Tender  No palor, No icterus, No pedal edema  Nose: normal nose  Eyes: pupils reactive bilaterally  Neck: FROM  Cardiovascular: nl s1 s2 no murmur or gallop  Pulmonary/Chest: B/L equal air entry, vesicular breath sounds B/L, no wheezes or crackles  Abdominal: Soft NT , no palpable organomegaly, normal Bowel sounds  Musculoskeletal:  normal ROM in UE and LE  Neurological:  no weakness in UE and LE, reflexes are normal  Skin: no rashes or bruises  Psychiatric:normal mood and behavior      ASSESSMENT AND PLAN:     Sore throat with extensive cervical/submandibular digastric lymphadenopathy.  Concern for infectious mononucleosis.  Rapid strep test is negative.  Ordered Monospot test.  If the swelling does not go away in the next 2 to 3 weeks, the patient may need biopsy of the liver.    Advised to avoid contact sport for the next 6 weeks.  Avoid kissing in next 2-4 weeks.    Per patient he has negative HIV in 6 months ago      Patient/Caregiver Education: Patient/Caregiver Education: There are no barriers to learning. Medical education done. Outcome: Patient verbalizes understanding. Patient is notified to call with any questions, complications, allergies, or worsening or changing symptoms.  Patient is to call with any side effects or complications from  the treatments as a result of today.    No follow-ups on file.    Reviewed Past Medical History and   There is no problem list on file for this patient.      No orders of the defined types were placed in this encounter.    Requested Prescriptions      No prescriptions requested or ordered in this encounter         Juan Manuel Smith MD         [1] No Known Allergies  [2]   Current Outpatient Medications on File Prior to Visit   Medication Sig Dispense Refill    ondansetron 8 MG Oral Tablet Dispersible Take 1 tablet (8 mg total) by mouth every 6 (six) hours as needed for Nausea. 10 tablet 0    amoxicillin clavulanate 875-125 MG Oral Tab Take 1 tablet by mouth 2 (two) times daily for 10 days. 20 tablet 0    clotrimazole-betamethasone 1-0.05 % External Cream Apply 1 Application topically 2 (two) times daily as needed. 45 g 0    Hydrocortisone Ace-Pramoxine 1-1 % External Cream Apply 1 Application topically in the morning and 1 Application before bedtime. (Patient not taking: Reported on 3/10/2025) 30 g 0     No current facility-administered medications on file prior to visit.

## 2025-03-10 NOTE — PATIENT INSTRUCTIONS
- You most likely have infectious mononucleosis.  -Avoid contact sports for 4 to 6 weeks from now.  - Avoid kissing in next 2-4 weeks  - If swelling of neck persists over next 3 weeks, please visit to your PCP or with me .

## 2025-03-15 ENCOUNTER — HOSPITAL ENCOUNTER (OUTPATIENT)
Age: 20
Discharge: HOME | End: 2025-03-15
Payer: COMMERCIAL

## 2025-03-15 VITALS
SYSTOLIC BLOOD PRESSURE: 120 MMHG | BODY MASS INDEX: 25.9 KG/M2 | TEMPERATURE: 98 F | DIASTOLIC BLOOD PRESSURE: 78 MMHG | OXYGEN SATURATION: 100 % | HEIGHT: 67 IN | WEIGHT: 165 LBS | RESPIRATION RATE: 18 BRPM | HEART RATE: 84 BPM

## 2025-03-15 DIAGNOSIS — Z11.3 SCREENING EXAMINATION FOR STD (SEXUALLY TRANSMITTED DISEASE): Primary | ICD-10-CM

## 2025-03-15 PROCEDURE — 99213 OFFICE O/P EST LOW 20 MIN: CPT

## 2025-03-15 PROCEDURE — 87591 N.GONORRHOEAE DNA AMP PROB: CPT | Performed by: NURSE PRACTITIONER

## 2025-03-15 PROCEDURE — 99212 OFFICE O/P EST SF 10 MIN: CPT

## 2025-03-15 PROCEDURE — 87491 CHLMYD TRACH DNA AMP PROBE: CPT | Performed by: NURSE PRACTITIONER

## 2025-03-15 NOTE — ED PROVIDER NOTES
Patient Seen in: [facility]      History     Chief Complaint   Patient presents with    Sexually Transmitted Infection Screen     Stated Complaint: std screening    Subjective:   The history is provided by the patient.     Patient is a 19-year-old male here for his \"yearly STD check.\"  Denies exposure to STI, dysuria or penile discharge.  Patient is sexually active.  Unprotected, 1 female partner, one.     Denies genital rashes or ulcerations.    Was seen today at an outside clinic for diffuse rash.      Objective:     History reviewed. No pertinent past medical history.           History reviewed. No pertinent surgical history.             Social History     Socioeconomic History    Marital status: Single   Tobacco Use    Smoking status: Former     Types: Cigarettes    Smokeless tobacco: Former   Vaping Use    Vaping status: Never Used   Substance and Sexual Activity    Alcohol use: Yes     Comment: Moderate    Drug use: Not Currently   Other Topics Concern    Caffeine Concern No    Exercise Yes     Comment: 3-4x/week              Review of Systems    Positive for stated complaint: std screening  Other systems are as noted in HPI.  Constitutional and vital signs reviewed.      All other systems reviewed and negative except as noted above.    Physical Exam     ED Triage Vitals [03/15/25 1027]   /78   Pulse 84   Resp 18   Temp 98 °F (36.7 °C)   Temp src Oral   SpO2 100 %   O2 Device None (Room air)       Current Vitals:   Vital Signs  BP: 120/78  Pulse: 84  Resp: 18  Temp: 98 °F (36.7 °C)  Temp src: Oral    Oxygen Therapy  SpO2: 100 %  O2 Device: None (Room air)        Physical Exam  Vitals and nursing note reviewed.   Constitutional:       General: He is not in acute distress.     Appearance: Normal appearance. He is not ill-appearing, toxic-appearing or diaphoretic.   HENT:      Mouth/Throat:      Mouth: Mucous membranes are moist.   Eyes:      Conjunctiva/sclera: Conjunctivae normal.       Pupils: Pupils are equal, round, and reactive to light.   Cardiovascular:      Rate and Rhythm: Normal rate.   Pulmonary:      Effort: Pulmonary effort is normal.   Genitourinary:     Comments: Defers exam  Neurological:      Mental Status: He is alert.           ED Course     Labs Reviewed   CHLAMYDIA/GONOCOCCUS, PAWEL             MDM            Medical Decision Making  Patient is asymptomatic and denies known exposure to STI.  Urine GC/CT pending.  Will hold empiric treatment at this time.  Patient agrees with plan of care.  All questions answered to patient satisfaction    Amount and/or Complexity of Data Reviewed  Labs: ordered. Decision-making details documented in ED Course.        Disposition and Plan     Clinical Impression:  1. Screening examination for STD (sexually transmitted disease)         Disposition:  Discharge  3/15/2025 10:52 am    Follow-up:  Juan Manuel Smith MD  [address] [place] [id number]  [phone]      As needed          Medications Prescribed:  Discharge Medication List as of 3/15/2025 10:54 AM              Supplementary Documentation:

## 2025-03-15 NOTE — ED INITIAL ASSESSMENT (HPI)
Presents for STD screening, performs annually. No known exposure or symptoms. Also has an allergic rash, but was seen today for issue by another provider.

## 2025-03-17 LAB
C TRACH DNA SPEC QL NAA+PROBE: NEGATIVE
N GONORRHOEA DNA SPEC QL NAA+PROBE: NEGATIVE